# Patient Record
Sex: FEMALE | Race: WHITE | NOT HISPANIC OR LATINO | Employment: STUDENT | URBAN - NONMETROPOLITAN AREA
[De-identification: names, ages, dates, MRNs, and addresses within clinical notes are randomized per-mention and may not be internally consistent; named-entity substitution may affect disease eponyms.]

---

## 2024-08-27 ENCOUNTER — HOSPITAL ENCOUNTER (EMERGENCY)
Facility: HOSPITAL | Age: 24
Discharge: HOME | End: 2024-08-28
Attending: STUDENT IN AN ORGANIZED HEALTH CARE EDUCATION/TRAINING PROGRAM
Payer: MEDICAID

## 2024-08-27 ENCOUNTER — APPOINTMENT (OUTPATIENT)
Dept: GENERAL RADIOLOGY | Facility: HOSPITAL | Age: 24
End: 2024-08-27
Payer: MEDICAID

## 2024-08-27 DIAGNOSIS — M25.572 PAIN AND SWELLING OF LEFT ANKLE: Primary | ICD-10-CM

## 2024-08-27 DIAGNOSIS — M25.472 PAIN AND SWELLING OF LEFT ANKLE: Primary | ICD-10-CM

## 2024-08-27 PROCEDURE — 99283 EMERGENCY DEPT VISIT LOW MDM: CPT

## 2024-08-27 PROCEDURE — 73610 X-RAY EXAM OF ANKLE: CPT

## 2024-08-27 RX ORDER — SERTRALINE HYDROCHLORIDE 25 MG/1
1 TABLET, FILM COATED ORAL DAILY
COMMUNITY

## 2024-08-27 RX ORDER — LEVONORGESTREL/ETHIN.ESTRADIOL 0.1-0.02MG
1 TABLET ORAL DAILY
COMMUNITY

## 2024-08-27 RX ORDER — ATOMOXETINE 40 MG/1
1 CAPSULE ORAL DAILY
COMMUNITY

## 2024-08-28 VITALS
HEART RATE: 90 BPM | HEIGHT: 63 IN | OXYGEN SATURATION: 98 % | SYSTOLIC BLOOD PRESSURE: 121 MMHG | WEIGHT: 245 LBS | DIASTOLIC BLOOD PRESSURE: 75 MMHG | RESPIRATION RATE: 18 BRPM | BODY MASS INDEX: 43.41 KG/M2 | TEMPERATURE: 97.5 F

## 2024-08-28 NOTE — DISCHARGE INSTRUCTIONS
Rest ice elevate and use walking boot and crutches as needed.  Follow-up with orthopedic surgery for further evaluation if your symptoms do not resolve in the next 5 to 7 days.

## 2024-08-28 NOTE — ED PROVIDER NOTES
EMERGENCY DEPARTMENT ENCOUNTER    Pt Name: Kathi Rossi  MRN: [id number]  Pt :   2000  Room Number:  19SF/19  Date of encounter:  2024  PCP: Provider, No Known  ED Provider: Rufus Holly PA-C    Historian: Patient, nursing notes      HPI:  Chief Complaint: Left ankle pain and swelling        Context: Kathi Rossi is a 23 y.o. female who presents to the ED c/o pain and swelling in her left ankle status post mechanical trip and fall accident that occurred on campus earlier today.  Patient states she had an trip and fall injury to the left ankle and has previously injury to tendons in that ankle several years ago.  Patient denies any head injury or loss of consciousness, knee pain, foot pain, hip pain or any other complaint today.  Patient states she can ambulate but with some pain in the left ankle.      PAST MEDICAL HISTORY  Past Medical History:   Diagnosis Date    Anxiety     Heart murmur          PAST SURGICAL HISTORY  Past Surgical History:   Procedure Laterality Date    ADENOIDECTOMY      TONSILLECTOMY           FAMILY HISTORY  History reviewed. No pertinent family history.      SOCIAL HISTORY  Social History     Socioeconomic History    Marital status: Single   Tobacco Use    Smoking status: Never    Smokeless tobacco: Never   Vaping Use    Vaping status: Never Used   Substance and Sexual Activity    Alcohol use: Yes     Comment: socially    Drug use: Never    Sexual activity: Defer         ALLERGIES  Amoxicillin        REVIEW OF SYSTEMS  Review of Systems   Constitutional:  Negative for chills and fever.   HENT:  Negative for congestion and sore throat.    Respiratory:  Negative for cough and shortness of breath.    Cardiovascular:  Negative for chest pain.   Gastrointestinal:  Negative for abdominal pain, nausea and vomiting.   Genitourinary:  Negative for dysuria.   Musculoskeletal:  Negative for back pain and neck pain.        Left ankle pain and swelling   Skin:  Negative for  wound.   Neurological:  Negative for dizziness and headaches.   Psychiatric/Behavioral:  Negative for confusion.    All other systems reviewed and are negative.         All systems reviewed and negative except for those discussed in HPI.       PHYSICAL EXAM    I have reviewed the triage vital signs and nursing notes.    ED Triage Vitals [08/27/24 2102]   Temp Heart Rate Resp BP SpO2   97.5 °F (36.4 °C) (!) 123 20 (!) 181/95 98 %      Temp src Heart Rate Source Patient Position BP Location FiO2 (%)   Oral Monitor Sitting Left arm --       Physical Exam  Vitals and nursing note reviewed.   Constitutional:       General: She is not in acute distress.     Appearance: She is not ill-appearing, toxic-appearing or diaphoretic.   HENT:      Head: Normocephalic and atraumatic.      Mouth/Throat:      Mouth: Mucous membranes are moist.      Pharynx: Oropharynx is clear.   Eyes:      Extraocular Movements: Extraocular movements intact.   Cardiovascular:      Rate and Rhythm: Normal rate.      Heart sounds: Normal heart sounds.   Pulmonary:      Effort: Pulmonary effort is normal. No respiratory distress.      Breath sounds: Normal breath sounds.   Abdominal:      Tenderness: There is no abdominal tenderness.   Musculoskeletal:      Left ankle: Swelling present. Tenderness present over the lateral malleolus. No base of 5th metatarsal or proximal fibula tenderness.      Left foot: Normal.   Skin:     General: Skin is warm and dry.      Findings: No rash.   Neurological:      Mental Status: She is alert.             LAB RESULTS  No results found for this or any previous visit (from the past 24 hour(s)).    If labs were ordered, I independently reviewed the results and considered them in treating the patient.        RADIOLOGY  XR Ankle 3+ View Left    Result Date: 8/27/2024  FINAL REPORT TECHNIQUE: null CLINICAL HISTORY: LE Injury triage protocol COMPARISON: null FINDINGS: 3 view left ankle Comparison: None Findings: No acute  fractures or dislocations. No significant arthritic change or erosions. No ankle effusion. No radiopaque foreign body.     IMPRESSION: No acute fracture or dislocation. Authenticated and Electronically Signed by Floyd Anderson MD on 08/27/2024 11:19:35 PM     I ordered and independently reviewed the above noted radiographic studies.      I viewed images of left ankle x-ray which showed no acute bony abnormality per my independent interpretation.    See radiologist's dictation for official interpretation.        PROCEDURES    Procedures    No orders to display       MEDICATIONS GIVEN IN ER    Medications - No data to display      MEDICAL DECISION MAKING, PROGRESS, and CONSULTS    All labs, if obtained, have been independently reviewed by me.  All radiology studies, if obtained, have been reviewed by me and the radiologist dictating the report.  All EKG's, if obtained, have been independently viewed and interpreted by me/my attending physician.      Discussion below represents my analysis of pertinent findings related to patient's condition, differential diagnosis, treatment plan and final disposition.    23-year-old female presenting for evaluation of left ankle pain and swelling after mechanical trip and fall.  No head injury or LOC.  The patient is upright alert oriented communicating normally in full sentences and in no acute distress.  Her vital signs as interpreted by me are all stable and within normal limits.  Her left lower extremity exhibits swelling and tenderness over the left lateral malleolus.  Full active and passive range of motion of the left ankle and foot is intact.  LLE is neurovascularly intact with easily palpable DP and PT pulses normal sensation and brisk capillary refill.  X-ray of the left ankle was ordered per radiologist report there were no acute fracture or dislocations of the ankle.  I watched the patient ambulate she can bear weight with but woke with some pain.  I feel she is stable for  discharge and outpatient orthopedic surgery follow-up.  Rest ice and elevation recommended.  Encouraged alternating Tylenol with ibuprofen.  Patient given a walking boot and crutches.  Urgent orthopedic surgery referral was ordered by me and strict ED return precautions were explained and the patient verbalized understanding and agreement with the plan of care.                     Differential diagnosis:    Differential diagnosis included but was not limited to fall, fracture, dislocation, ligamentous injury, sprain, strain      Additional sources:    - Discussed/ obtained information from independent historians: None    - External (non-ED) record review: None available    - Chronic or social conditions impacting care: None    Orders placed during this visit:  Orders Placed This Encounter   Procedures    [facility] 11.  Crutches, 08.  CAM Boot; Yes; Yes; pain; Yes; Prevents Accomplishing MRADLs; Able to Safely Use Equipment; Mobility Deficit Can Be Sufficiently Resolved By Use of Equipment    XR Ankle 3+ View Left    Ambulatory Referral to Orthopedic Surgery    Obtain & Apply The Following- Lower extremity; Walking boot    Crutches (fit & training)         Additional orders considered but not ordered: None      ED Course:    Consultants: None                Shared Decision Making:  After my consideration of clinical presentation and any laboratory/radiology studies obtained, I discussed the findings with the patient/patient representative who is in agreement with the treatment plan and the final disposition.   Risks and benefits of discharge and/or observation/admission were discussed.       AS OF 02:03 EDT VITALS:    BP - 121/75  HR - 90  TEMP - 97.5 °F (36.4 °C) (Oral)  O2 SATS - 98%                  DIAGNOSIS  Final diagnoses:   Pain and swelling of left ankle         DISPOSITION  Discharge      Please note that portions of this document were completed with voice recognition software.       Rufus Holly PA-C  08/28/24 020